# Patient Record
Sex: MALE | Race: WHITE | Employment: UNEMPLOYED | URBAN - METROPOLITAN AREA
[De-identification: names, ages, dates, MRNs, and addresses within clinical notes are randomized per-mention and may not be internally consistent; named-entity substitution may affect disease eponyms.]

---

## 2018-01-01 ENCOUNTER — HOSPITAL ENCOUNTER (INPATIENT)
Age: 53
LOS: 2 days | DRG: 064 | End: 2018-03-27
Attending: FAMILY MEDICINE | Admitting: FAMILY MEDICINE

## 2018-01-01 ENCOUNTER — APPOINTMENT (OUTPATIENT)
Dept: GENERAL RADIOLOGY | Age: 53
DRG: 064 | End: 2018-01-01
Attending: FAMILY MEDICINE

## 2018-01-01 ENCOUNTER — APPOINTMENT (OUTPATIENT)
Dept: CT IMAGING | Age: 53
DRG: 064 | End: 2018-01-01
Attending: FAMILY MEDICINE

## 2018-01-01 DIAGNOSIS — I61.9 INTRAPARENCHYMAL HEMORRHAGE OF BRAIN (HCC): Primary | ICD-10-CM

## 2018-01-01 LAB
AADO2: 108.8 MMHG
AADO2: 97.3 MMHG
ACETAMINOPHEN LEVEL: <15 MCG/ML (ref 10–30)
ALBUMIN SERPL-MCNC: 3.9 G/DL (ref 3.5–5.2)
ALP BLD-CCNC: 91 U/L (ref 40–129)
ALT SERPL-CCNC: 15 U/L (ref 0–40)
AMPHETAMINE SCREEN, URINE: NOT DETECTED
ANION GAP SERPL CALCULATED.3IONS-SCNC: 15 MMOL/L (ref 7–16)
ANISOCYTOSIS: ABNORMAL
AST SERPL-CCNC: 23 U/L (ref 0–39)
B.E.: -1.2 MMOL/L (ref -3–3)
B.E.: 1.1 MMOL/L (ref -3–3)
B.E.: 2.1 MMOL/L (ref -3–3)
BARBITURATE SCREEN URINE: NOT DETECTED
BASOPHILS ABSOLUTE: 0.01 E9/L (ref 0–0.2)
BASOPHILS RELATIVE PERCENT: 0.1 % (ref 0–2)
BENZODIAZEPINE SCREEN, URINE: NOT DETECTED
BILIRUB SERPL-MCNC: 0.3 MG/DL (ref 0–1.2)
BUN BLDV-MCNC: 10 MG/DL (ref 6–20)
CALCIUM SERPL-MCNC: 8.5 MG/DL (ref 8.6–10.2)
CANNABINOID SCREEN URINE: POSITIVE
CHLORIDE BLD-SCNC: 110 MMOL/L (ref 98–107)
CO2: 23 MMOL/L (ref 22–29)
COCAINE METABOLITE SCREEN URINE: NOT DETECTED
COHB: 0.2 % (ref 0–1.5)
COMMENT: ABNORMAL
CREAT SERPL-MCNC: 1 MG/DL (ref 0.7–1.2)
CRITICAL: ABNORMAL
DATE ANALYZED: ABNORMAL
DATE OF COLLECTION: ABNORMAL
EOSINOPHILS ABSOLUTE: 0 E9/L (ref 0.05–0.5)
EOSINOPHILS RELATIVE PERCENT: 0 % (ref 0–6)
ETHANOL: <10 MG/DL (ref 0–0.08)
ETHANOL: <10 MG/DL (ref 0–0.08)
FIO2: 60 %
FIO2: 60 %
GFR AFRICAN AMERICAN: >60
GFR NON-AFRICAN AMERICAN: >60 ML/MIN/1.73
GLUCOSE BLD-MCNC: 336 MG/DL (ref 74–109)
HCO3: 26 MMOL/L (ref 22–26)
HCO3: 26.9 MMOL/L (ref 22–26)
HCO3: 30.2 MMOL/L (ref 22–26)
HCT VFR BLD CALC: 55.8 % (ref 37–54)
HEMOGLOBIN: 19.6 G/DL (ref 12.5–16.5)
HHB: 0.4 % (ref 0–5)
HHB: 0.5 % (ref 0–5)
HHB: 0.6 % (ref 0–5)
IMMATURE GRANULOCYTES #: 0.04 E9/L
IMMATURE GRANULOCYTES %: 0.4 % (ref 0–5)
LAB: 9558
LYMPHOCYTES ABSOLUTE: 0.35 E9/L (ref 1.5–4)
LYMPHOCYTES RELATIVE PERCENT: 3.8 % (ref 20–42)
Lab: 1459
Lab: 1533
Lab: 430
MAGNESIUM: 2.3 MG/DL (ref 1.6–2.6)
MCH RBC QN AUTO: 33.1 PG (ref 26–35)
MCHC RBC AUTO-ENTMCNC: 35.1 % (ref 32–34.5)
MCV RBC AUTO: 94.1 FL (ref 80–99.9)
METER GLUCOSE: 114 MG/DL (ref 70–110)
METER GLUCOSE: 144 MG/DL (ref 70–110)
METER GLUCOSE: 162 MG/DL (ref 70–110)
METER GLUCOSE: 247 MG/DL (ref 70–110)
METER GLUCOSE: 51 MG/DL (ref 70–110)
METHADONE SCREEN, URINE: NOT DETECTED
METHB: 0.9 % (ref 0–1.5)
METHB: 0.9 % (ref 0–1.5)
METHB: 1 % (ref 0–1.5)
MODE: ABNORMAL
MODE: AC
MODE: AC
MONOCYTES ABSOLUTE: 0.82 E9/L (ref 0.1–0.95)
MONOCYTES RELATIVE PERCENT: 9 % (ref 2–12)
NEUTROPHILS ABSOLUTE: 7.93 E9/L (ref 1.8–7.3)
NEUTROPHILS RELATIVE PERCENT: 86.7 % (ref 43–80)
O2 CONTENT: 29.5 ML/DL
O2 CONTENT: 30 ML/DL
O2 SATURATION: 99.4 % (ref 92–98.5)
O2 SATURATION: 99.5 % (ref 92–98.5)
O2 SATURATION: 99.6 % (ref 92–98.5)
O2HB: 98.3 % (ref 94–97)
O2HB: 98.3 % (ref 94–97)
O2HB: 98.5 % (ref 94–97)
OPERATOR ID: 1394
OPERATOR ID: 7490
OPERATOR ID: ABNORMAL
OPIATE SCREEN URINE: NOT DETECTED
OVALOCYTES: ABNORMAL
PATIENT TEMP: 37 C
PCO2: 41.9 MMHG (ref 35–45)
PCO2: 42 MMHG (ref 35–45)
PCO2: 76.3 MMHG (ref 35–45)
PDW BLD-RTO: 14 FL (ref 11.5–15)
PEEP/CPAP: 5 CMH?O
PEEP/CPAP: 5 CMH?O
PFO2: 4.3 MMHG/%
PFO2: 4.49 MMHG/%
PH BLOOD GAS: 7.21 (ref 7.35–7.45)
PH BLOOD GAS: 7.41 (ref 7.35–7.45)
PH BLOOD GAS: 7.42 (ref 7.35–7.45)
PHENCYCLIDINE SCREEN URINE: NOT DETECTED
PHOSPHORUS: 2.9 MG/DL (ref 2.5–4.5)
PLATELET # BLD: 242 E9/L (ref 130–450)
PMV BLD AUTO: 10.1 FL (ref 7–12)
PO2: 257.8 MMHG (ref 60–100)
PO2: 269.4 MMHG (ref 60–100)
PO2: >500 MMHG (ref 60–100)
POIKILOCYTES: ABNORMAL
POTASSIUM SERPL-SCNC: 5 MMOL/L (ref 3.5–5)
PROPOXYPHENE SCREEN: NOT DETECTED
RBC # BLD: 5.93 E12/L (ref 3.8–5.8)
RI(T): 0.36
RI(T): 0.42
RR MECHANICAL: 12 B/MIN
RR MECHANICAL: 12 B/MIN
SALICYLATE, SERUM: <0.3 MG/DL (ref 0–30)
SODIUM BLD-SCNC: 148 MMOL/L (ref 132–146)
SOURCE, BLOOD GAS: ABNORMAL
THB: 21 G/DL (ref 11.5–16.5)
THB: 21.2 G/DL (ref 11.5–16.5)
THB: 21.4 G/DL (ref 11.5–16.5)
TIME ANALYZED: 1459
TIME ANALYZED: 1535
TIME ANALYZED: 435
TOTAL PROTEIN: 6.9 G/DL (ref 6.4–8.3)
TRICYCLIC ANTIDEPRESSANTS SCREEN SERUM: NEGATIVE NG/ML
VT MECHANICAL: 500 ML
VT MECHANICAL: 500 ML
WBC # BLD: 9.2 E9/L (ref 4.5–11.5)

## 2018-01-01 PROCEDURE — 36556 INSERT NON-TUNNEL CV CATH: CPT

## 2018-01-01 PROCEDURE — 94761 N-INVAS EAR/PLS OXIMETRY MLT: CPT

## 2018-01-01 PROCEDURE — 94003 VENT MGMT INPAT SUBQ DAY: CPT

## 2018-01-01 PROCEDURE — 71045 X-RAY EXAM CHEST 1 VIEW: CPT

## 2018-01-01 PROCEDURE — 99232 SBSQ HOSP IP/OBS MODERATE 35: CPT | Performed by: NURSE PRACTITIONER

## 2018-01-01 PROCEDURE — 6370000000 HC RX 637 (ALT 250 FOR IP): Performed by: NURSE PRACTITIONER

## 2018-01-01 PROCEDURE — 80307 DRUG TEST PRSMV CHEM ANLYZR: CPT

## 2018-01-01 PROCEDURE — 70450 CT HEAD/BRAIN W/O DYE: CPT

## 2018-01-01 PROCEDURE — 2580000003 HC RX 258: Performed by: SURGERY

## 2018-01-01 PROCEDURE — 2580000003 HC RX 258: Performed by: NURSE PRACTITIONER

## 2018-01-01 PROCEDURE — 6360000002 HC RX W HCPCS: Performed by: NURSE PRACTITIONER

## 2018-01-01 PROCEDURE — C9113 INJ PANTOPRAZOLE SODIUM, VIA: HCPCS | Performed by: NURSE PRACTITIONER

## 2018-01-01 PROCEDURE — 84100 ASSAY OF PHOSPHORUS: CPT

## 2018-01-01 PROCEDURE — 83735 ASSAY OF MAGNESIUM: CPT

## 2018-01-01 PROCEDURE — C9248 INJ, CLEVIDIPINE BUTYRATE: HCPCS | Performed by: NEUROLOGICAL SURGERY

## 2018-01-01 PROCEDURE — G0480 DRUG TEST DEF 1-7 CLASSES: HCPCS

## 2018-01-01 PROCEDURE — 36415 COLL VENOUS BLD VENIPUNCTURE: CPT

## 2018-01-01 PROCEDURE — 2500000003 HC RX 250 WO HCPCS: Performed by: NEUROLOGICAL SURGERY

## 2018-01-01 PROCEDURE — 94002 VENT MGMT INPAT INIT DAY: CPT

## 2018-01-01 PROCEDURE — 2500000003 HC RX 250 WO HCPCS: Performed by: SURGERY

## 2018-01-01 PROCEDURE — 99291 CRITICAL CARE FIRST HOUR: CPT | Performed by: SURGERY

## 2018-01-01 PROCEDURE — 82962 GLUCOSE BLOOD TEST: CPT

## 2018-01-01 PROCEDURE — 6360000002 HC RX W HCPCS: Performed by: NEUROLOGICAL SURGERY

## 2018-01-01 PROCEDURE — 03HY32Z INSERTION OF MONITORING DEVICE INTO UPPER ARTERY, PERCUTANEOUS APPROACH: ICD-10-PCS | Performed by: INTERNAL MEDICINE

## 2018-01-01 PROCEDURE — 02HV33Z INSERTION OF INFUSION DEVICE INTO SUPERIOR VENA CAVA, PERCUTANEOUS APPROACH: ICD-10-PCS | Performed by: INTERNAL MEDICINE

## 2018-01-01 PROCEDURE — 82805 BLOOD GASES W/O2 SATURATION: CPT

## 2018-01-01 PROCEDURE — 2000000000 HC ICU R&B

## 2018-01-01 PROCEDURE — 36620 INSERTION CATHETER ARTERY: CPT

## 2018-01-01 PROCEDURE — 85025 COMPLETE CBC W/AUTO DIFF WBC: CPT

## 2018-01-01 PROCEDURE — 87081 CULTURE SCREEN ONLY: CPT

## 2018-01-01 PROCEDURE — 80053 COMPREHEN METABOLIC PANEL: CPT

## 2018-01-01 PROCEDURE — 5A1945Z RESPIRATORY VENTILATION, 24-96 CONSECUTIVE HOURS: ICD-10-PCS | Performed by: INTERNAL MEDICINE

## 2018-01-01 RX ORDER — PANTOPRAZOLE SODIUM 40 MG/10ML
40 INJECTION, POWDER, LYOPHILIZED, FOR SOLUTION INTRAVENOUS DAILY
Status: DISCONTINUED | OUTPATIENT
Start: 2018-01-01 | End: 2018-03-27 | Stop reason: HOSPADM

## 2018-01-01 RX ORDER — 0.9 % SODIUM CHLORIDE 0.9 %
10 VIAL (ML) INJECTION DAILY
Status: DISCONTINUED | OUTPATIENT
Start: 2018-01-01 | End: 2018-03-27 | Stop reason: HOSPADM

## 2018-01-01 RX ORDER — HYDRALAZINE HYDROCHLORIDE 20 MG/ML
10 INJECTION INTRAMUSCULAR; INTRAVENOUS EVERY 10 MIN PRN
Status: DISCONTINUED | OUTPATIENT
Start: 2018-01-01 | End: 2018-03-27 | Stop reason: HOSPADM

## 2018-01-01 RX ORDER — NOREPINEPHRINE BITARTRATE 1 MG/ML
INJECTION, SOLUTION INTRAVENOUS
Status: DISPENSED
Start: 2018-01-01 | End: 2018-01-01

## 2018-01-01 RX ORDER — DEXTROSE MONOHYDRATE 25 G/50ML
12.5 INJECTION, SOLUTION INTRAVENOUS PRN
Status: DISCONTINUED | OUTPATIENT
Start: 2018-01-01 | End: 2018-03-27 | Stop reason: HOSPADM

## 2018-01-01 RX ORDER — HYDRALAZINE HYDROCHLORIDE 20 MG/ML
INJECTION INTRAMUSCULAR; INTRAVENOUS
Status: DISCONTINUED
Start: 2018-01-01 | End: 2018-01-01 | Stop reason: WASHOUT

## 2018-01-01 RX ORDER — NICOTINE POLACRILEX 4 MG
15 LOZENGE BUCCAL PRN
Status: DISCONTINUED | OUTPATIENT
Start: 2018-01-01 | End: 2018-03-27 | Stop reason: HOSPADM

## 2018-01-01 RX ORDER — SODIUM CHLORIDE 9 MG/ML
INJECTION, SOLUTION INTRAVENOUS CONTINUOUS
Status: DISCONTINUED | OUTPATIENT
Start: 2018-01-01 | End: 2018-03-27

## 2018-01-01 RX ORDER — METOPROLOL TARTRATE 5 MG/5ML
10 INJECTION INTRAVENOUS EVERY 30 MIN PRN
Status: DISCONTINUED | OUTPATIENT
Start: 2018-01-01 | End: 2018-03-27 | Stop reason: HOSPADM

## 2018-01-01 RX ORDER — METOPROLOL TARTRATE 5 MG/5ML
5 INJECTION INTRAVENOUS
Status: DISCONTINUED | OUTPATIENT
Start: 2018-01-01 | End: 2018-01-01

## 2018-01-01 RX ORDER — 0.9 % SODIUM CHLORIDE 0.9 %
1000 INTRAVENOUS SOLUTION INTRAVENOUS ONCE
Status: COMPLETED | OUTPATIENT
Start: 2018-01-01 | End: 2018-01-01

## 2018-01-01 RX ORDER — CHLORHEXIDINE GLUCONATE 0.12 MG/ML
15 RINSE ORAL 4 TIMES DAILY
Status: DISCONTINUED | OUTPATIENT
Start: 2018-01-01 | End: 2018-03-27 | Stop reason: HOSPADM

## 2018-01-01 RX ORDER — DEXTROSE MONOHYDRATE 50 MG/ML
100 INJECTION, SOLUTION INTRAVENOUS PRN
Status: DISCONTINUED | OUTPATIENT
Start: 2018-01-01 | End: 2018-03-27 | Stop reason: HOSPADM

## 2018-01-01 RX ORDER — POLYVINYL ALCOHOL 14 MG/ML
1 SOLUTION/ DROPS OPHTHALMIC
Status: DISCONTINUED | OUTPATIENT
Start: 2018-01-01 | End: 2018-03-27 | Stop reason: HOSPADM

## 2018-01-01 RX ORDER — LABETALOL HYDROCHLORIDE 5 MG/ML
10 INJECTION, SOLUTION INTRAVENOUS EVERY 10 MIN PRN
Status: DISCONTINUED | OUTPATIENT
Start: 2018-01-01 | End: 2018-03-27 | Stop reason: HOSPADM

## 2018-01-01 RX ADMIN — SODIUM CHLORIDE 1000 ML: 9 INJECTION, SOLUTION INTRAVENOUS at 00:00

## 2018-01-01 RX ADMIN — DEXTROSE MONOHYDRATE 12.5 G: 25 INJECTION, SOLUTION INTRAVENOUS at 13:01

## 2018-01-01 RX ADMIN — CHLORHEXIDINE GLUCONATE 15 ML: 1.2 RINSE ORAL at 13:00

## 2018-01-01 RX ADMIN — POLYVINYL ALCOHOL 1 DROP: 14 SOLUTION/ DROPS OPHTHALMIC at 17:56

## 2018-01-01 RX ADMIN — POLYVINYL ALCOHOL 1 DROP: 14 SOLUTION/ DROPS OPHTHALMIC at 20:03

## 2018-01-01 RX ADMIN — POLYVINYL ALCOHOL 1 DROP: 14 SOLUTION/ DROPS OPHTHALMIC at 14:04

## 2018-01-01 RX ADMIN — LABETALOL HYDROCHLORIDE 10 MG: 5 INJECTION, SOLUTION INTRAVENOUS at 22:43

## 2018-01-01 RX ADMIN — PANTOPRAZOLE SODIUM 40 MG: 40 INJECTION, POWDER, FOR SOLUTION INTRAVENOUS at 08:38

## 2018-01-01 RX ADMIN — CHLORHEXIDINE GLUCONATE 15 ML: 1.2 RINSE ORAL at 17:56

## 2018-01-01 RX ADMIN — CLEVIPIDINE 2 MG/HR: 0.5 EMULSION INTRAVENOUS at 19:40

## 2018-01-01 RX ADMIN — NOREPINEPHRINE BITARTRATE 2 MCG/MIN: 1 INJECTION INTRAVENOUS at 00:00

## 2018-01-01 RX ADMIN — POLYVINYL ALCOHOL 1 DROP: 14 SOLUTION/ DROPS OPHTHALMIC at 22:33

## 2018-01-01 RX ADMIN — INSULIN LISPRO 6 UNITS: 100 INJECTION, SOLUTION INTRAVENOUS; SUBCUTANEOUS at 08:47

## 2018-01-01 RX ADMIN — CHLORHEXIDINE GLUCONATE 15 ML: 1.2 RINSE ORAL at 21:01

## 2018-01-01 RX ADMIN — Medication 10 ML: at 08:39

## 2018-01-01 RX ADMIN — METOPROLOL TARTRATE 5 MG: 5 INJECTION, SOLUTION INTRAVENOUS at 23:02

## 2018-01-01 RX ADMIN — POLYVINYL ALCOHOL 1 DROP: 14 SOLUTION/ DROPS OPHTHALMIC at 11:45

## 2018-01-01 RX ADMIN — LABETALOL HYDROCHLORIDE 10 MG: 5 INJECTION, SOLUTION INTRAVENOUS at 21:46

## 2018-01-01 RX ADMIN — METOROPROLOL TARTRATE 10 MG: 5 INJECTION, SOLUTION INTRAVENOUS at 08:37

## 2018-01-01 RX ADMIN — LABETALOL HYDROCHLORIDE 10 MG: 5 INJECTION, SOLUTION INTRAVENOUS at 21:00

## 2018-01-01 RX ADMIN — CHLORHEXIDINE GLUCONATE 15 ML: 1.2 RINSE ORAL at 08:46

## 2018-01-01 RX ADMIN — SODIUM CHLORIDE: 9 INJECTION, SOLUTION INTRAVENOUS at 16:52

## 2018-01-01 RX ADMIN — NOREPINEPHRINE BITARTRATE 17 MCG/MIN: 1 INJECTION INTRAVENOUS at 14:48

## 2018-01-01 RX ADMIN — METOPROLOL TARTRATE 5 MG: 5 INJECTION, SOLUTION INTRAVENOUS at 22:11

## 2018-01-01 RX ADMIN — POLYVINYL ALCOHOL 1 DROP: 14 SOLUTION/ DROPS OPHTHALMIC at 16:00

## 2018-01-01 RX ADMIN — CLEVIPIDINE 21 MG/HR: 0.5 EMULSION INTRAVENOUS at 22:03

## 2018-01-01 ASSESSMENT — PULMONARY FUNCTION TESTS
PIF_VALUE: 18
PIF_VALUE: 10
PIF_VALUE: 11
PIF_VALUE: 30
PIF_VALUE: 12
PIF_VALUE: 14
PIF_VALUE: 19
PIF_VALUE: 33
PIF_VALUE: 17
PIF_VALUE: 32
PIF_VALUE: 18
PIF_VALUE: 16
PIF_VALUE: 18
PIF_VALUE: 19
PIF_VALUE: 15
PIF_VALUE: 14
PIF_VALUE: 11
PIF_VALUE: 11

## 2018-03-25 PROBLEM — I61.9 CEREBRAL BRAIN HEMORRHAGE (HCC): Status: ACTIVE | Noted: 2018-01-01

## 2018-03-25 PROBLEM — I61.9 INTRAPARENCHYMAL HEMORRHAGE OF BRAIN (HCC): Status: ACTIVE | Noted: 2018-01-01

## 2018-03-26 NOTE — PROCEDURES
Lalita Hernandez is a 50 y.o. male patient. No diagnosis found. Past Medical History:   Diagnosis Date    Epistaxis     Hypertension      Blood pressure (!) 171/95, pulse 129, temperature 98.9 °F (37.2 °C), temperature source Axillary, resp. rate 22, SpO2 100 %. Insert Arterial Line  Date/Time: 3/25/2018 10:39 PM  Performed by: Sherman Dakin by: Whit Obregon   Consent: Verbal consent not obtained. Written consent obtained. Consent given by: spouse  Required items: required blood products, implants, devices, and special equipment available  Patient identity confirmed: arm band  Time out: Immediately prior to procedure a \"time out\" was called to verify the correct patient, procedure, equipment, support staff and site/side marked as required. Preparation: Patient was prepped and draped in the usual sterile fashion.   Indications: hemodynamic monitoring  Location: left radial    Sedation:  Patient sedated: no  Suresh's test normal: yes  Needle gauge: 22  Seldinger technique: Seldinger technique used  Number of attempts: 1  Post-procedure: line sutured and dressing applied  Post-procedure CMS: normal  Patient tolerance: Patient tolerated the procedure well with no immediate complications          Chano Soria MD  3/25/2018

## 2018-03-26 NOTE — H&P
Hospital Medicine History & Physical      PCP: No primary care provider on file. Date of Admission: 3/25/2018    Date of Service: 3-25-18    Chief Complaint:      ICH      History Of Present Illness:     51 yo male hx HTN came to OSH w/ left sided weakness and found to have htn and ICH on  Ct head, showed rt frontal ICH, 10 cm, mass effect, midline shift, given iv decadron/mannitol, noted HTN and given iv nitroprussid, pt was intubated at OSH and has decorticated posture  Labs negative trop, BMP/cbc INR unremarkable, LFT NORMAL, BAL 0.014  EKG NSR  /119 at presentation  Repeat ct head  Per nsgy \" large deep basal ganglia bleed not compatible with meaningful outcome , Surgery would be futile\"      Past Medical History:          Diagnosis Date    Epistaxis     Hypertension        Past Surgical History:          Procedure Laterality Date    DENTAL SURGERY  06/23/2016    full mouth extractions       Medications Prior to Admission:      Prior to Admission medications    Medication Sig Start Date End Date Taking? Authorizing Provider   ibuprofen (ADVIL) 200 MG tablet Take 2 tablets by mouth every 6 hours as needed for Pain 6/23/16   Jorge Hopkins DDS   cloNIDine (CATAPRES) 0.1 MG tablet Take 0.1 mg by mouth 2 times daily    Historical Provider, MD   lisinopril-hydrochlorothiazide (PRINZIDE;ZESTORETIC) 10-12.5 MG per tablet Take 1 tablet by mouth daily    Historical Provider, MD       Allergies:  Patient has no known allergies. Social History:          TOBACCO:   reports that he has been smoking. He has been smoking about 1.00 pack per day. He does not have any smokeless tobacco history on file. ETOH:   reports that he drinks about 7.2 oz of alcohol per week . Family History:       Reviewed in detail and negative for DM, CAD, Cancer, CVA.  Positive as follows:        Problem Relation Age of Onset    Cancer Mother     Cancer Father        REVIEW OF SYSTEMS:   Pertinent positives as noted in

## 2018-03-26 NOTE — PLAN OF CARE
Problem: HEMODYNAMIC STATUS  Goal: Patient has stable vital signs and fluid balance  Outcome: Not Met This Shift

## 2018-03-26 NOTE — FLOWSHEET NOTE
First set of brain criteria performed by Dr. Pratt Rather. Testing is consistent with brain death. [facility] notified.

## 2018-03-27 VITALS
WEIGHT: 190.48 LBS | BODY MASS INDEX: 29.83 KG/M2 | HEART RATE: 120 BPM | TEMPERATURE: 98.8 F | SYSTOLIC BLOOD PRESSURE: 128 MMHG | DIASTOLIC BLOOD PRESSURE: 105 MMHG | OXYGEN SATURATION: 99 %

## 2018-03-27 LAB
METER GLUCOSE: 151 MG/DL (ref 70–110)
METER GLUCOSE: 155 MG/DL (ref 70–110)
MRSA CULTURE ONLY: NORMAL

## 2018-03-27 PROCEDURE — 6370000000 HC RX 637 (ALT 250 FOR IP): Performed by: NURSE PRACTITIONER

## 2018-03-27 PROCEDURE — 94003 VENT MGMT INPAT SUBQ DAY: CPT

## 2018-03-27 PROCEDURE — 2580000003 HC RX 258: Performed by: NURSE PRACTITIONER

## 2018-03-27 PROCEDURE — C9113 INJ PANTOPRAZOLE SODIUM, VIA: HCPCS | Performed by: NURSE PRACTITIONER

## 2018-03-27 PROCEDURE — 82962 GLUCOSE BLOOD TEST: CPT

## 2018-03-27 PROCEDURE — 6360000002 HC RX W HCPCS: Performed by: NURSE PRACTITIONER

## 2018-03-27 RX ADMIN — POLYVINYL ALCOHOL 1 DROP: 14 SOLUTION/ DROPS OPHTHALMIC at 02:33

## 2018-03-27 RX ADMIN — POLYVINYL ALCOHOL 1 DROP: 14 SOLUTION/ DROPS OPHTHALMIC at 04:47

## 2018-03-27 RX ADMIN — POLYVINYL ALCOHOL 1 DROP: 14 SOLUTION/ DROPS OPHTHALMIC at 00:55

## 2018-03-27 RX ADMIN — POLYVINYL ALCOHOL 1 DROP: 14 SOLUTION/ DROPS OPHTHALMIC at 13:37

## 2018-03-27 RX ADMIN — POLYVINYL ALCOHOL 1 DROP: 14 SOLUTION/ DROPS OPHTHALMIC at 12:05

## 2018-03-27 RX ADMIN — POLYVINYL ALCOHOL 1 DROP: 14 SOLUTION/ DROPS OPHTHALMIC at 07:42

## 2018-03-27 RX ADMIN — POLYVINYL ALCOHOL 1 DROP: 14 SOLUTION/ DROPS OPHTHALMIC at 10:38

## 2018-03-27 RX ADMIN — POLYVINYL ALCOHOL 1 DROP: 14 SOLUTION/ DROPS OPHTHALMIC at 06:39

## 2018-03-27 RX ADMIN — SODIUM CHLORIDE: 9 INJECTION, SOLUTION INTRAVENOUS at 04:47

## 2018-03-27 RX ADMIN — CHLORHEXIDINE GLUCONATE 15 ML: 1.2 RINSE ORAL at 08:50

## 2018-03-27 RX ADMIN — PANTOPRAZOLE SODIUM 40 MG: 40 INJECTION, POWDER, FOR SOLUTION INTRAVENOUS at 08:50

## 2018-03-27 RX ADMIN — Medication 10 ML: at 08:50

## 2018-03-27 RX ADMIN — CHLORHEXIDINE GLUCONATE 15 ML: 1.2 RINSE ORAL at 13:42

## 2018-03-27 ASSESSMENT — PULMONARY FUNCTION TESTS
PIF_VALUE: 19
PIF_VALUE: 20
PIF_VALUE: 19
PIF_VALUE: 19

## 2018-03-27 NOTE — PROGRESS NOTES
DAILY VENTILATOR WEANING ASSESSMENT PERFORMED    P/FIO2 Ratio =  NO AM ABG        (<100= do not Wean)                  Cs = 57                         (<32= Instability)  Plat. Pressure = 16  MV = 6.5  RSBI = N/A  Instabilities:       Cardiovascular =       CNS = CNS 2       Respiratory =       Metabolic =    Parameters   NO  Wean per protocol  NO    Ask Physician for a weaning planNO  Additional Comments:     Performed by [address] RRT      Reference Table:    Cardiovascular     CNS      1. Mean BP less than or equal to 75   1. Neuromuscular blockade  2. Heart Rate greater than 130   2. RASS of -3, -4, -5  3. Myocardial Ischemia    3. RASS of +3, +4  4. Mechanical Assist Device    4. ICP greater than 15 or             Intracranial Hypertension         Respiratory      Metabolic  1. PEEP equal to or greater than 10cm/H20  1. Temp. (8hrs) less than 95 or > 103  2. Respiratory Rate greater than 35   2. WBC < 5000 or > 80643  3. Minute Volume greater than 15L  4. pH less than 7.30  5.  Deteriorating chest X-ray
[facility]  NEURO INTENSIVE CARE UNIT    ATTENDING PHYSICIAN CRITICAL CARE PROGRESS NOTE     I have examined the patient, reviewed the record, and discussed the case with the APN/  Resident. I have reviewed all relevant labs and imaging data. Please refer to the  APN/ resident's note. I agree with the  assessment and plan with the following corrections/ additions. The following summarizes my clinical findings and independent assessment. CC: hemorrhagic stroke    HOSPITAL COURSE:  3/25 admitted to [address] [place] [address] for hemorhagic stroke    EXAM:  Intubated  Off all sedation  Pupils fixed and dilated  No response to painful stimuli  Abdomen soft nt nd    ASSESSMENT:  Active Problems:    Intraparenchymal hemorrhage of brain (HCC)    Cerebral brain hemorrhage (HCC)  Resolved Problems:    * No resolved hospital problems. *       PLAN:  Sedation/ Pain: off all sedation  Rt hemorrhagic stroke--clinically appears to have lost brainstem reflexes. 1st set brain death testing performed by me. No signs of brain activity. Discussed test results with pt's family at bedside  Will have 2nd phyiscian perform second set    CV: off all bp meds    Pulmonary: Acute resp failure from hemorrhagic stroke--continue full vent support    GI: npo    FEN: hypernatremia appropriate in setting of hemorrhagic stroke    ID: afebrile off abx       DVT prophylaxis--SCDS,    GI Prophylaxis--PPI  Lines-- central line/ arterial line  CODE: FULL    DISPOSITION-Continue ICU Care    Critical care time exclusive of teaching and procedures = 35 min     Pt needs continuous ICU monitoring because the patient is at risk for deterioration from a neurological standpoint.     Adia Hernandez MD, FACS  3/26/2018  2:59 PM
Hospitalist Progress Note      PCP: No primary care provider on file. Date of Admission: 3/25/2018  Days in the hospital: 2    Chief Complaint: Intracranial hemorrhage    Hospital Course:     Patient was transferred from an outside hospital with an intracranial hemorrhage. Neurosurgery was consulted and did not recommend any surgical intervention. Brain death criteria performed and pronounced brain dead. Subjective  Patient seen and examined at bedside. Patient is intubated at this time. No family present currently. Medications:  Reviewed    Infusion Medications    dextrose      sodium chloride 75 mL/hr at 03/27/18 0447    norepinephrine 11 mcg/min (03/27/18 0300)     Scheduled Medications    insulin lispro  0-18 Units Subcutaneous Q4H    pantoprazole  40 mg Intravenous Daily    And    sodium chloride (PF)  10 mL Intravenous Daily    polyvinyl alcohol  1 drop Both Eyes Q2H    chlorhexidine  15 mL Mouth/Throat 4x Daily     PRN Meds: glucose, dextrose, glucagon (rDNA), dextrose, hydrALAZINE, labetalol, metoprolol      Intake/Output Summary (Last 24 hours) at 03/27/18 0759  Last data filed at 03/27/18 0653   Gross per 24 hour   Intake          1292.47 ml   Output             1845 ml   Net          -552.53 ml       Exam:    BP (!) 147/113   Pulse 115   Temp 98.2 °F (36.8 °C) (Esophageal)   Resp 12   Wt 190 lb 7.6 oz (86.4 kg)   SpO2 97%   BMI 29.83 kg/m²     General appearance: unresponsive intubated  HEENT:  Intubated  Neck: Trachea midline. Respiratory:  Normal respiratory effort. Clear to auscultation, bilaterally without Rales/Wheezes/Rhonchi. Cardiovascular:  Regular rate and rhythm with normal S1/S2 without murmurs, rubs or gallops. Abdomen: Soft, non-tender, non-distended with normal bowel sounds. Musculoskeletal:  No clubbing, cyanosis or edema bilaterally. Full range of motion without deformity. Skin: Skin color, texture, turgor normal.  No rashes or lesions.   Neurologic:
[facility] notified of patient
Neurosurgery  Head ct reveals large deep basal ganglia bleed not compatible with meaningful outcome  Surgery would be futile
03/26/18 0430   WBC  9.2   HGB  19.6*   HCT  55.8*   PLT  242     Recent Labs      03/26/18 0430   NA  148*   K  5.0   CL  110*   CO2  23   BUN  10   CREATININE  1.0   CALCIUM  8.5*   PHOS  2.9     Recent Labs      03/26/18 0430   AST  23   ALT  15   BILITOT  0.3   ALKPHOS  91     No results for input(s): INR in the last 72 hours. No results for input(s): Yobani Hem in the last 72 hours. Imaging:  XR CHEST PORTABLE   Final Result   1. Left subclavian central venous catheter tip in the SVC. No   pneumothorax on the right and left   2. Endotracheal tube is just proximal to the upper contour of the arch   of the aorta   3. NG tube in the stomach. XR Chest Abdomen Ng Placement   Final Result   NG tube at the expected level the stomach. The stomach appears to be distended with air                  XR CHEST PORTABLE   Final Result   Tortuous ectatic aorta   Mild congestive changes                  CT HEAD WO CONTRAST   Final Result   1. Large hyperacute/acute intraparenchymal hemorrhage which is   centered in the region of the right basal ganglia area with extensions   throughout the brain parenchyma of the right cerebral hemisphere   causing significant mass effect, as discussed. Midline shift to the   left of 2.5 cm is observed. 2. Trans-falcine herniation is observed. 3. Significant compression of the brainstem at the level of the   tentorium incisura is seen, with effacement of the ambient cistern. 4. Preliminary report given to Dr. Nicole Peña, neurologist at the time of   the interpretation, 8:29 PM.      ALERT:  THIS IS AN ABNORMAL REPORT.             Assessment/Plan:  · Intracranial hemorrhage  · Neurosurgery consulted  · No acute surgical intervention  · Poor outcome  · Hypertension  · When necessary IV antihypertensives  · Acute respiratory failure with hypoxia secondary to intracranial hemorrhage  · Currently intubated  · Hyperglycemia  · Continue with sliding scale
MUSCULOSKELETAL: No withdraw to pain in any extremity. SKIN/EXTREMITIES: No rashes/ecchymosis, no edema/clubbing, warm/dry, good capillary refill. Peripheral IVs.  Right radial arterial line. Day 0. Good waveform. Left IJ TLC. Day 2.    \  Recent Labs      03/26/18   0430   WBC  9.2   HGB  19.6*   HCT  55.8*   MCV  94.1   PLT  242       Recent Labs      03/26/18   0430   NA  148*   K  5.0   CO2  23   PHOS  2.9   BUN  10   CREATININE  1.0       Ct Head Wo Contrast.  Result Date: 3/25/2018. 1. Large hyperacute/acute intraparenchymal hemorrhage which is centered in the region of the right basal ganglia area with extensions throughout the brain parenchyma of the right cerebral hemisphere causing significant mass effect, as discussed. Midline shift to the left of 2.5 cm is observed. 2. Trans-falcine herniation is observed. 3. Significant compression of the brainstem at the level of the tentorium incisura is seen, with effacement of the ambient cistern. 4. Preliminary report given to Dr. Brian Munoz, neurologist at the time of the interpretation    Xr Chest Portable. Result Date: 3/25/2018. 1. Left subclavian central venous catheter tip in the SVC. No pneumothorax on the right and left 2. Endotracheal tube is just proximal to the upper contour of the arch of the aorta 3. NG tube in the stomach. Xr Chest Portable. Result Date: 3/25/2018. Tortuous ectatic aorta Mild congestive changes     Xr Chest Abdomen Ng Placement. Result Date: 3/25/2018. NG tube at the expected level the stomach. The stomach appears to be distended with air       ASSESSMENT & PLAN   Patient Active Problem List   Diagnosis    Intraparenchymal hemorrhage of brain ([address]    Cerebral brain hemorrhage ([address]     Neuro:  Large right basal ganglia intracranial hemorrhage - Monitor neuro status. Neurosurgery does not recommend surgery due to the large deep basal ganglion bleed not compatible with meaningful outcome. CV: Hypertension.

## 2018-03-31 LAB — CANNABINOIDS CONF, URINE: 54 NG/ML
